# Patient Record
Sex: MALE | Race: OTHER | NOT HISPANIC OR LATINO
[De-identification: names, ages, dates, MRNs, and addresses within clinical notes are randomized per-mention and may not be internally consistent; named-entity substitution may affect disease eponyms.]

---

## 2019-11-06 ENCOUNTER — RESULT REVIEW (OUTPATIENT)
Age: 59
End: 2019-11-06

## 2020-07-31 PROBLEM — Z00.00 ENCOUNTER FOR PREVENTIVE HEALTH EXAMINATION: Status: ACTIVE | Noted: 2020-07-31

## 2020-08-06 ENCOUNTER — APPOINTMENT (OUTPATIENT)
Dept: UROLOGY | Facility: CLINIC | Age: 60
End: 2020-08-06
Payer: COMMERCIAL

## 2020-08-06 ENCOUNTER — OUTPATIENT (OUTPATIENT)
Dept: OUTPATIENT SERVICES | Facility: HOSPITAL | Age: 60
LOS: 1 days | Discharge: HOME | End: 2020-08-06

## 2020-08-06 DIAGNOSIS — N40.1 BENIGN PROSTATIC HYPERPLASIA WITH LOWER URINARY TRACT SYMPTOMS: ICD-10-CM

## 2020-08-06 DIAGNOSIS — Z78.9 OTHER SPECIFIED HEALTH STATUS: ICD-10-CM

## 2020-08-06 DIAGNOSIS — R39.12 POOR URINARY STREAM: ICD-10-CM

## 2020-08-06 DIAGNOSIS — R39.89 OTHER SYMPTOMS AND SIGNS INVOLVING THE GENITOURINARY SYSTEM: ICD-10-CM

## 2020-08-06 DIAGNOSIS — R97.20 ELEVATED PROSTATE SPECIFIC ANTIGEN [PSA]: ICD-10-CM

## 2020-08-06 PROCEDURE — 99203 OFFICE O/P NEW LOW 30 MIN: CPT

## 2020-09-01 PROBLEM — Z78.9 CURRENT NON-SMOKER: Status: ACTIVE | Noted: 2020-09-01

## 2020-09-01 NOTE — HISTORY OF PRESENT ILLNESS
[FreeTextEntry1] : This is a 60 year male who presented with high PSA of 129 on July 2020 -- old medical records that he brought in were reviewed. \par Had high psa in 2007 but doesn’t remember the value.  had no insurance at that time \par \par no blood in the urine\par \par also nocturia x 5, weak urinary stream and sensation of incomplete bladder emptying

## 2020-09-01 NOTE — PHYSICAL EXAM
[General Appearance - Well Developed] : well developed [General Appearance - Well Nourished] : well nourished [Normal Appearance] : normal appearance [Well Groomed] : well groomed [General Appearance - In No Acute Distress] : no acute distress [Edema] : no peripheral edema [Respiration, Rhythm And Depth] : normal respiratory rhythm and effort [Exaggerated Use Of Accessory Muscles For Inspiration] : no accessory muscle use [Abdomen Soft] : soft [Abdomen Tenderness] : non-tender [Costovertebral Angle Tenderness] : no ~M costovertebral angle tenderness [Urethral Meatus] : meatus normal [Urinary Bladder Findings] : the bladder was normal on palpation [Scrotum] : the scrotum was normal [FreeTextEntry1] : firm prostate [] : no rash [No Focal Deficits] : no focal deficits [Affect] : the affect was normal [Mood] : the mood was normal [Not Anxious] : not anxious

## 2020-09-11 ENCOUNTER — APPOINTMENT (OUTPATIENT)
Dept: UROLOGY | Facility: CLINIC | Age: 60
End: 2020-09-11

## 2020-10-05 LAB
APPEARANCE: CLEAR
BACTERIA UR CULT: NORMAL
BILIRUBIN URINE: NEGATIVE
BLOOD URINE: NEGATIVE
COLOR: NORMAL
GLUCOSE QUALITATIVE U: NEGATIVE
KETONES URINE: NEGATIVE
LEUKOCYTE ESTERASE URINE: NEGATIVE
NITRITE URINE: NEGATIVE
PH URINE: 6
PROTEIN URINE: NORMAL
PSA FREE FLD-MCNC: 6 %
PSA FREE SERPL-MCNC: 5.5 NG/ML
PSA SERPL-MCNC: 87.2 NG/ML
SPECIFIC GRAVITY URINE: 1.02
UROBILINOGEN URINE: NORMAL

## 2020-10-20 ENCOUNTER — NON-APPOINTMENT (OUTPATIENT)
Age: 60
End: 2020-10-20

## 2021-08-18 ENCOUNTER — APPOINTMENT (OUTPATIENT)
Dept: UROLOGY | Facility: CLINIC | Age: 61
End: 2021-08-18
Payer: COMMERCIAL

## 2021-08-18 ENCOUNTER — TRANSCRIPTION ENCOUNTER (OUTPATIENT)
Age: 61
End: 2021-08-18

## 2021-08-18 VITALS — WEIGHT: 165 LBS | BODY MASS INDEX: 30.36 KG/M2 | HEIGHT: 62 IN

## 2021-08-18 PROCEDURE — 99214 OFFICE O/P EST MOD 30 MIN: CPT

## 2021-08-18 NOTE — PHYSICAL EXAM
[Prostate Tenderness] : the prostate was not tender [Prostate Size ___ gm] : prostate size [unfilled] gm [FreeTextEntry1] : uniform firmness prostate

## 2021-08-18 NOTE — ASSESSMENT
[FreeTextEntry1] : This is a 60 year male who presented with high PSA of 129 on July 2020 -- old medical records that he brought in were reviewed. \par Had high psa in 2007 but doesn’t remember the value. had no insurance at that time \par \par no blood in the urine\par \par also nocturia x 5, weak urinary stream and sensation of incomplete bladder emptying. \par \par prostate was firm on HERSON \par \par  august 2020\par Culture - Urine; no growth\par PSA Profile - Total = 87\par Urinalysis w/Reflex;  neg\par \par Started: Dutasteride-Tamsulosin but he only took for a month or two\par he no showed for his visit thereafter-  he said had many time contraints due to his job\par

## 2021-08-25 ENCOUNTER — APPOINTMENT (OUTPATIENT)
Dept: UROLOGY | Facility: CLINIC | Age: 61
End: 2021-08-25
Payer: COMMERCIAL

## 2021-08-25 ENCOUNTER — LABORATORY RESULT (OUTPATIENT)
Age: 61
End: 2021-08-25

## 2021-08-25 DIAGNOSIS — R39.89 OTHER SYMPTOMS AND SIGNS INVOLVING THE GENITOURINARY SYSTEM: ICD-10-CM

## 2021-08-25 LAB
APPEARANCE: CLEAR
BACTERIA UR CULT: NORMAL
BILIRUBIN URINE: NEGATIVE
BLOOD URINE: NEGATIVE
COLOR: NORMAL
GLUCOSE QUALITATIVE U: NEGATIVE
KETONES URINE: NEGATIVE
LEUKOCYTE ESTERASE URINE: NEGATIVE
NITRITE URINE: NEGATIVE
PH URINE: 7.5
PROTEIN URINE: NEGATIVE
PSA FREE FLD-MCNC: 5 %
PSA FREE SERPL-MCNC: 7.42 NG/ML
PSA SERPL-MCNC: 138 NG/ML
SPECIFIC GRAVITY URINE: 1.01
UROBILINOGEN URINE: NORMAL

## 2021-08-25 PROCEDURE — 55700: CPT

## 2021-08-25 PROCEDURE — 99213 OFFICE O/P EST LOW 20 MIN: CPT | Mod: 25

## 2021-08-25 NOTE — ASSESSMENT
[FreeTextEntry1] : This is a 61 year male who presented with high PSA of 129 on July 2020 -- old medical records that he brought in were reviewed. \par Had high psa in 2007 but doesn’t remember the value. had no insurance at that time \par \par no blood in the urine\par \par also nocturia x 5, weak urinary stream and sensation of incomplete bladder emptying. \par \par prostate was firm on HERSON \par \par  august 2020\par Culture - Urine; no growth\par PSA Profile - Total = 87\par Urinalysis w/Reflex; neg\par \par Started: Dutasteride-Tamsulosin but he only took for a month or two\par he no showed for his visit thereafter- he said had many time contraints due to his job\par . \par Aug 2021\par Culture - Urine; negative\par PSA Profile - Total = 138-- 5% free psa\par Urinalysis w/Reflex; neg;

## 2021-08-25 NOTE — HISTORY OF PRESENT ILLNESS
[FreeTextEntry1] : This is a 61 year male who presented with high PSA of 129 on July 2020 -- old medical records that he brought in were reviewed. \par Had high psa in 2007 but doesn’t remember the value. had no insurance at that time \par \par no blood in the urine\par \par also nocturia x 5, weak urinary stream and sensation of incomplete bladder emptying. \par \par prostate was firm on HERSON \par \par  august 2020\par Culture - Urine; no growth\par PSA Profile - Total = 87\par Urinalysis w/Reflex; neg\par \par Started: Dutasteride-Tamsulosin but he only took for a month or two\par he no showed for his visit thereafter- he said had many time contraints due to his job\par . \par Aug 2021\par Culture - Urine; negative\par PSA Profile - Total = 138-- 5% free psa\par Urinalysis w/Reflex; neg; \par

## 2021-09-08 ENCOUNTER — APPOINTMENT (OUTPATIENT)
Dept: UROLOGY | Facility: CLINIC | Age: 61
End: 2021-09-08
Payer: COMMERCIAL

## 2021-09-08 VITALS — WEIGHT: 165 LBS | BODY MASS INDEX: 30.36 KG/M2 | HEIGHT: 62 IN

## 2021-09-08 PROCEDURE — 99214 OFFICE O/P EST MOD 30 MIN: CPT

## 2021-09-08 NOTE — HISTORY OF PRESENT ILLNESS
[FreeTextEntry1] : This is a 61 year male who presented with high PSA of 129 on July 2020 -- old medical records that he brought in were reviewed. \par Had high psa in 2007 but doesn’t remember the value. had no insurance at that time \par \par no blood in the urine\par \par also nocturia x 5, weak urinary stream and sensation of incomplete bladder emptying. \par \par prostate was firm on HERSON \par \par  august 2020\par Culture - Urine; no growth\par PSA Profile - Total = 87\par Urinalysis w/Reflex; neg\par \par Started: Dutasteride-Tamsulosin but he only took for a month or two\par he no showed for his visit thereafter- he said had many time contraints due to his job\par . \par Aug 2021\par Culture - Urine; negative\par PSA Profile - Total = 138-- 5% free psa\par Urinalysis w/Reflex; neg;. \par \par states that flomax and finasteride have helped urination substantially -- very happy with these results\par follow up in two weeks for prostate biopsy results \par \par prostate biopsy results from August 2021\par duglas 3+4 = 7  in 6 of 12 cores\par

## 2021-09-08 NOTE — ASSESSMENT
[FreeTextEntry1] : This is a 61 year male who presented with high PSA of 129 on July 2020 -- old medical records that he brought in were reviewed. \par Had high psa in 2007 but doesn’t remember the value. had no insurance at that time \par \par no blood in the urine\par \par also nocturia x 5, weak urinary stream and sensation of incomplete bladder emptying. \par \par prostate was firm on HERSON \par \par  august 2020\par Culture - Urine; no growth\par PSA Profile - Total = 87\par Urinalysis w/Reflex; neg\par \par Started: Dutasteride-Tamsulosin but he only took for a month or two\par he no showed for his visit thereafter- he said had many time contraints due to his job\par . \par Aug 2021\par Culture - Urine; negative\par PSA Profile - Total = 138-- 5% free psa\par Urinalysis w/Reflex; neg;. \par \par states that flomax and finasteride have helped urination substantially -- very happy with these results\par follow up in two weeks for prostate biopsy results \par \par prostate biopsy results from August 2021\par duglas 3+4 = 7  in 6 of 12 cores

## 2021-09-15 ENCOUNTER — RESULT REVIEW (OUTPATIENT)
Age: 61
End: 2021-09-15

## 2021-09-16 LAB — CREAT SERPL-MCNC: 0.9 MG/DL

## 2021-09-29 ENCOUNTER — OUTPATIENT (OUTPATIENT)
Dept: OUTPATIENT SERVICES | Facility: HOSPITAL | Age: 61
LOS: 1 days | Discharge: HOME | End: 2021-09-29
Payer: COMMERCIAL

## 2021-09-29 DIAGNOSIS — R10.2 PELVIC AND PERINEAL PAIN: ICD-10-CM

## 2021-09-29 DIAGNOSIS — C61 MALIGNANT NEOPLASM OF PROSTATE: ICD-10-CM

## 2021-09-29 PROCEDURE — 72193 CT PELVIS W/DYE: CPT | Mod: 26

## 2021-10-01 ENCOUNTER — OUTPATIENT (OUTPATIENT)
Dept: OUTPATIENT SERVICES | Facility: HOSPITAL | Age: 61
LOS: 1 days | Discharge: HOME | End: 2021-10-01
Payer: COMMERCIAL

## 2021-10-01 ENCOUNTER — RESULT REVIEW (OUTPATIENT)
Age: 61
End: 2021-10-01

## 2021-10-01 DIAGNOSIS — C61 MALIGNANT NEOPLASM OF PROSTATE: ICD-10-CM

## 2021-10-01 PROCEDURE — 78803 RP LOCLZJ TUM SPECT 1 AREA: CPT | Mod: 26

## 2021-10-01 PROCEDURE — 78306 BONE IMAGING WHOLE BODY: CPT | Mod: 26

## 2021-10-13 ENCOUNTER — APPOINTMENT (OUTPATIENT)
Dept: UROLOGY | Facility: CLINIC | Age: 61
End: 2021-10-13
Payer: COMMERCIAL

## 2021-10-13 VITALS — HEIGHT: 62 IN | WEIGHT: 165 LBS | BODY MASS INDEX: 30.36 KG/M2

## 2021-10-13 DIAGNOSIS — R97.20 ELEVATED PROSTATE, SPECIFIC ANTIGEN [PSA]: ICD-10-CM

## 2021-10-13 PROCEDURE — 99214 OFFICE O/P EST MOD 30 MIN: CPT

## 2021-10-13 RX ORDER — TAMSULOSIN HYDROCHLORIDE 0.4 MG/1
0.4 CAPSULE ORAL
Qty: 90 | Refills: 3 | Status: DISCONTINUED | COMMUNITY
Start: 2021-08-18 | End: 2021-10-13

## 2021-10-13 NOTE — HISTORY OF PRESENT ILLNESS
[FreeTextEntry1] : This is a 61 year male who presented with high PSA of 129 on July 2020 -- old medical records that he brought in were reviewed. \par Had high psa in 2007 but doesn’t remember the value. had no insurance at that time \par \par no blood in the urine\par \par also nocturia x 5, weak urinary stream and sensation of incomplete bladder emptying. \par \par prostate was firm on HERSON \par \par  august 2020\par Culture - Urine; no growth\par PSA Profile - Total = 87\par Urinalysis w/Reflex; neg\par \par Started: Dutasteride-Tamsulosin but he only took for a month or two\par he no showed for his visit thereafter- he said had many time contraints due to his job\par . \par Aug 2021\par Culture - Urine; negative\par PSA Profile - Total = 138-- 5% free psa\par Urinalysis w/Reflex; neg;. \par \par states that flomax and finasteride have helped urination substantially -- very happy with these results\par follow up in two weeks for prostate biopsy results \par \par prostate biopsy results from August 2021\par duglas 3+4 = 7 in 6 of 12 cores. \par \par  new results since last visit:\par Creatinine, Serum = 0.9\par CT Pelvis w/wo IV Cont; no lymphadenopathy\par NM 3-Phase Bone Scan; possible old trauma in left anterior third rib, otherwise no specific suggestion of metastasis\par

## 2021-10-20 ENCOUNTER — APPOINTMENT (OUTPATIENT)
Dept: RADIATION ONCOLOGY | Facility: HOSPITAL | Age: 61
End: 2021-10-20
Payer: COMMERCIAL

## 2021-10-20 VITALS
BODY MASS INDEX: 31.93 KG/M2 | OXYGEN SATURATION: 97 % | TEMPERATURE: 97.9 F | WEIGHT: 174.56 LBS | RESPIRATION RATE: 16 BRPM | SYSTOLIC BLOOD PRESSURE: 143 MMHG | DIASTOLIC BLOOD PRESSURE: 79 MMHG | HEART RATE: 102 BPM

## 2021-10-20 DIAGNOSIS — Z78.9 OTHER SPECIFIED HEALTH STATUS: ICD-10-CM

## 2021-10-20 PROCEDURE — 99204 OFFICE O/P NEW MOD 45 MIN: CPT

## 2021-10-20 RX ORDER — DUTASTERIDE AND TAMSULOSIN HYDROCHLORIDE .5; .4 MG/1; MG/1
0.5-0.4 CAPSULE ORAL
Qty: 90 | Refills: 3 | Status: DISCONTINUED | COMMUNITY
Start: 2020-08-06 | End: 2021-10-20

## 2021-10-20 RX ORDER — AMLODIPINE BESYLATE 10 MG/1
10 TABLET ORAL
Refills: 0 | Status: ACTIVE | COMMUNITY

## 2021-10-20 RX ORDER — CIPROFLOXACIN HYDROCHLORIDE 500 MG/1
500 TABLET, FILM COATED ORAL
Qty: 2 | Refills: 0 | Status: DISCONTINUED | COMMUNITY
Start: 2021-08-18 | End: 2021-10-20

## 2021-10-29 ENCOUNTER — NON-APPOINTMENT (OUTPATIENT)
Age: 61
End: 2021-10-29

## 2021-10-29 PROCEDURE — 76872 US TRANSRECTAL: CPT | Mod: 26

## 2021-10-29 PROCEDURE — 55874 TPRNL PLMT BIODEGRDABL MATRL: CPT

## 2021-10-29 PROCEDURE — 55876 PLACE RT DEVICE/MARKER PROS: CPT

## 2021-11-17 PROCEDURE — 77338 DESIGN MLC DEVICE FOR IMRT: CPT | Mod: 26

## 2021-11-17 PROCEDURE — 77301 RADIOTHERAPY DOSE PLAN IMRT: CPT | Mod: 26

## 2021-11-17 PROCEDURE — 77300 RADIATION THERAPY DOSE PLAN: CPT | Mod: 26

## 2021-12-06 PROCEDURE — G6002: CPT | Mod: 26

## 2021-12-07 ENCOUNTER — NON-APPOINTMENT (OUTPATIENT)
Age: 61
End: 2021-12-07

## 2021-12-07 VITALS
SYSTOLIC BLOOD PRESSURE: 153 MMHG | TEMPERATURE: 97 F | DIASTOLIC BLOOD PRESSURE: 88 MMHG | WEIGHT: 172 LBS | HEART RATE: 110 BPM | RESPIRATION RATE: 16 BRPM | OXYGEN SATURATION: 99 % | BODY MASS INDEX: 31.46 KG/M2

## 2021-12-07 PROCEDURE — G6002: CPT | Mod: 26

## 2021-12-07 RX ORDER — AMOXICILLIN AND CLAVULANATE POTASSIUM 875; 125 MG/1; MG/1
875-125 TABLET, COATED ORAL
Qty: 6 | Refills: 1 | Status: DISCONTINUED | COMMUNITY
Start: 2021-10-20 | End: 2021-12-07

## 2021-12-07 RX ORDER — DIAZEPAM 5 MG/1
5 TABLET ORAL
Qty: 2 | Refills: 0 | Status: COMPLETED | COMMUNITY
Start: 2021-10-20 | End: 2021-12-07

## 2021-12-07 NOTE — HISTORY OF PRESENT ILLNESS
[FreeTextEntry1] : 12/7/2021 OTV 540cGy/7020cGy to the prostate/pelvis: Pt received 2nd dose of RT. Denies any pain. Denies any burning, N/V/D. States he stopped taking Flomax due to side effects. Nocturia 1-2x/night. Skin care discussed.

## 2021-12-07 NOTE — PHYSICAL EXAM
[Normal] : oriented to person, place and time, the affect was normal, the mood was normal and not anxious [de-identified] : BMI 32

## 2021-12-07 NOTE — DISEASE MANAGEMENT
[Clinical] : TNM Stage: c [IIB] : IIB [TTNM] : 1c [NTNM] : o [MTNM] : o [de-identified] : 540cGy [de-identified] : 7017cGy [de-identified] : Prostate/pelvis

## 2021-12-08 PROCEDURE — G6002: CPT | Mod: 26

## 2021-12-09 PROCEDURE — G6002: CPT | Mod: 26

## 2021-12-10 PROCEDURE — 77427 RADIATION TX MANAGEMENT X5: CPT

## 2021-12-13 PROCEDURE — G6002: CPT | Mod: 26

## 2021-12-14 ENCOUNTER — NON-APPOINTMENT (OUTPATIENT)
Age: 61
End: 2021-12-14

## 2021-12-14 VITALS
BODY MASS INDEX: 31.46 KG/M2 | SYSTOLIC BLOOD PRESSURE: 125 MMHG | OXYGEN SATURATION: 98 % | HEART RATE: 95 BPM | WEIGHT: 172 LBS | DIASTOLIC BLOOD PRESSURE: 62 MMHG | TEMPERATURE: 97.2 F | RESPIRATION RATE: 16 BRPM

## 2021-12-14 PROCEDURE — G6002: CPT | Mod: 26

## 2021-12-14 NOTE — HISTORY OF PRESENT ILLNESS
[FreeTextEntry1] : 12/14/2021 OTV 1890cGy/7020cGy to the prostate/pelvis: pt denies any N/V/D, pain. Nocturia 1-2x/night. Skin care discussed.  denies any bowel issues.  \par \par 12/7/2021 OTV 540cGy/7020cGy to the prostate/pelvis: Pt received 2nd dose of RT. Denies any pain. Denies any burning, N/V/D. States he stopped taking Flomax due to side effects. Nocturia 1-2x/night. Skin care discussed.

## 2021-12-14 NOTE — DISEASE MANAGEMENT
[Clinical] : TNM Stage: c [IIB] : IIB [TTNM] : 1c [NTNM] : o [MTNM] : o [de-identified] : 1890cGy [de-identified] : 7007cGy [de-identified] : Prostate/pelvis

## 2021-12-14 NOTE — PHYSICAL EXAM
[Normal] : oriented to person, place and time, the affect was normal, the mood was normal and not anxious [de-identified] : BMI 32

## 2021-12-15 PROCEDURE — G6002: CPT | Mod: 26

## 2021-12-16 PROCEDURE — G6002: CPT | Mod: 26

## 2021-12-20 PROCEDURE — 77427 RADIATION TX MANAGEMENT X5: CPT

## 2021-12-20 PROCEDURE — G6002: CPT | Mod: 26

## 2021-12-21 ENCOUNTER — NON-APPOINTMENT (OUTPATIENT)
Age: 61
End: 2021-12-21

## 2021-12-21 VITALS
DIASTOLIC BLOOD PRESSURE: 63 MMHG | OXYGEN SATURATION: 96 % | WEIGHT: 172.38 LBS | RESPIRATION RATE: 16 BRPM | SYSTOLIC BLOOD PRESSURE: 122 MMHG | BODY MASS INDEX: 31.53 KG/M2 | TEMPERATURE: 97.3 F | HEART RATE: 83 BPM

## 2021-12-21 PROCEDURE — G6002: CPT | Mod: 26

## 2021-12-21 RX ORDER — FINASTERIDE 5 MG/1
5 TABLET, FILM COATED ORAL DAILY
Qty: 90 | Refills: 3 | Status: DISCONTINUED | COMMUNITY
Start: 2021-08-18 | End: 2021-12-21

## 2021-12-21 RX ORDER — SILODOSIN 4 MG/1
4 CAPSULE ORAL
Qty: 90 | Refills: 3 | Status: DISCONTINUED | COMMUNITY
Start: 2021-10-13 | End: 2021-12-21

## 2021-12-21 NOTE — REVIEW OF SYSTEMS
[Diarrhea: Grade 0] : Diarrhea: Grade 0 [Nausea: Grade 0] : Nausea: Grade 0 [Vomiting: Grade 0] : Vomiting: Grade 0 [Fatigue: Grade 0] : Fatigue: Grade 0 [Urinary Tract Pain: Grade 0] : Urinary Tract Pain: Grade 0 [Urinary Urgency: Grade 1 - Present] : Urinary Urgency: Grade 1 - Present [Urinary Frequency: Grade 1 - Present] : Urinary Frequency: Grade 1 - Present [Dermatitis Radiation: Grade 0] : Dermatitis Radiation: Grade 0

## 2021-12-22 PROCEDURE — G6002: CPT | Mod: 26

## 2021-12-22 NOTE — HISTORY OF PRESENT ILLNESS
[FreeTextEntry1] : 12/21/2021 OTV 3240cGy/7020cGy to the prostate/pelvis: Pt denies N/V/D. Notes slight increase in frequency. Nocturia 2-3x/night. Was prescribed Flomax but stopped due to congestion, which he feels was related to medication.\par \par 12/14/2021 OTV 1890cGy/7020cGy to the prostate/pelvis: pt denies any N/V/D, pain. Nocturia 1-2x/night. Skin care discussed.  denies any bowel issues.  \par \par 12/7/2021 OTV 540cGy/7020cGy to the prostate/pelvis: Pt received 2nd dose of RT. Denies any pain. Denies any burning, N/V/D. States he stopped taking Flomax due to side effects. Nocturia 1-2x/night. Skin care discussed.

## 2021-12-22 NOTE — PHYSICAL EXAM
[Sclera] : the sclera and conjunctiva were normal [Hearing Threshold Finger Rub Not [place]] : hearing was normal [] : no respiratory distress [Exaggerated Use Of Accessory Muscles For Inspiration] : no accessory muscle use [Normal] : oriented to person, place and time, the affect was normal, the mood was normal and not anxious

## 2021-12-27 PROCEDURE — G6002: CPT | Mod: 26

## 2021-12-28 ENCOUNTER — NON-APPOINTMENT (OUTPATIENT)
Age: 61
End: 2021-12-28

## 2021-12-28 VITALS
SYSTOLIC BLOOD PRESSURE: 123 MMHG | TEMPERATURE: 96.6 F | HEART RATE: 94 BPM | WEIGHT: 170 LBS | DIASTOLIC BLOOD PRESSURE: 80 MMHG | BODY MASS INDEX: 31.09 KG/M2 | OXYGEN SATURATION: 98 % | RESPIRATION RATE: 16 BRPM

## 2021-12-28 PROCEDURE — G6002: CPT | Mod: 26

## 2021-12-28 PROCEDURE — 77427 RADIATION TX MANAGEMENT X5: CPT

## 2021-12-28 NOTE — HISTORY OF PRESENT ILLNESS
[FreeTextEntry1] : 12/28/2021 OTV 4320cGy/7020cGy to the prostate/pelvis: Pt denies N/V/D. No complaints of burning or pain with urination. Nocturia 2-3x/night. No complaints at this time. \par \par 12/21/2021 OTV 3240cGy/7020cGy to the prostate/pelvis: Pt denies N/V/D. Notes slight increase in frequency. Nocturia 2-3x/night. Was prescribed Flomax but stopped due to congestion, which he feels was related to medication.\par \par 12/14/2021 OTV 1890cGy/7020cGy to the prostate/pelvis: pt denies any N/V/D, pain. Nocturia 1-2x/night. Skin care discussed.  denies any bowel issues.  \par \par 12/7/2021 OTV 540cGy/7020cGy to the prostate/pelvis: Pt received 2nd dose of RT. Denies any pain. Denies any burning, N/V/D. States he stopped taking Flomax due to side effects. Nocturia 1-2x/night. Skin care discussed.

## 2021-12-28 NOTE — DISEASE MANAGEMENT
[Clinical] : TNM Stage: c [IIB] : IIB [TTNM] : 1c [NTNM] : o [MTNM] : o [de-identified] : 5814cHr [de-identified] : 7010cGy [de-identified] : Prostate/pelvis

## 2021-12-28 NOTE — PHYSICAL EXAM
[Normal] : normoactive bowel sounds, soft and nontender, no hepatosplenomegaly or masses appreciated [de-identified] : BMI 32

## 2021-12-28 NOTE — REVIEW OF SYSTEMS
[Diarrhea: Grade 0] : Diarrhea: Grade 0 [Nausea: Grade 0] : Nausea: Grade 0 [Vomiting: Grade 0] : Vomiting: Grade 0 [Fatigue: Grade 0] : Fatigue: Grade 0 [Urinary Incontinence: Grade 0] : Urinary Incontinence: Grade 0  [Urinary Tract Pain: Grade 0] : Urinary Tract Pain: Grade 0 [Urinary Urgency: Grade 0] : Urinary Urgency: Grade 0 [Urinary Frequency: Grade 1 - Present] : Urinary Frequency: Grade 1 - Present [Dermatitis Radiation: Grade 0] : Dermatitis Radiation: Grade 0

## 2021-12-29 PROCEDURE — G6002: CPT | Mod: 26

## 2022-01-03 PROCEDURE — G6002: CPT | Mod: 26

## 2022-01-04 PROCEDURE — G6002: CPT | Mod: 26

## 2022-01-05 PROCEDURE — G6002: CPT | Mod: 26

## 2022-01-05 PROCEDURE — 77427 RADIATION TX MANAGEMENT X5: CPT

## 2022-01-06 PROCEDURE — G6002: CPT | Mod: 26

## 2022-01-07 ENCOUNTER — NON-APPOINTMENT (OUTPATIENT)
Age: 62
End: 2022-01-07

## 2022-01-07 VITALS
RESPIRATION RATE: 14 BRPM | DIASTOLIC BLOOD PRESSURE: 67 MMHG | WEIGHT: 174.8 LBS | HEART RATE: 95 BPM | BODY MASS INDEX: 31.97 KG/M2 | OXYGEN SATURATION: 99 % | SYSTOLIC BLOOD PRESSURE: 124 MMHG | TEMPERATURE: 96.1 F

## 2022-01-07 PROCEDURE — G6002: CPT | Mod: 26

## 2022-01-07 NOTE — HISTORY OF PRESENT ILLNESS
[FreeTextEntry1] : 1/7/2022 OTV 6210cGy/7020cGy to the prostate/pelvis: Patient states he feels well, denies pain or discomfort only complaint is frequency along with a weak stream. \par \par 12/28/2021 OTV 4320cGy/7020cGy to the prostate/pelvis: Pt denies N/V/D. No complaints of burning or pain with urination. Nocturia 2-3x/night. No complaints at this time. \par \par 12/21/2021 OTV 3240cGy/7020cGy to the prostate/pelvis: Pt denies N/V/D. Notes slight increase in frequency. Nocturia 2-3x/night. Was prescribed Flomax but stopped due to congestion, which he feels was related to medication.\par \par 12/14/2021 OTV 1890cGy/7020cGy to the prostate/pelvis: pt denies any N/V/D, pain. Nocturia 1-2x/night. Skin care discussed.  denies any bowel issues.  \par \par 12/7/2021 OTV 540cGy/7020cGy to the prostate/pelvis: Pt received 2nd dose of RT. Denies any pain. Denies any burning, N/V/D. States he stopped taking Flomax due to side effects. Nocturia 1-2x/night. Skin care discussed.

## 2022-01-07 NOTE — PHYSICAL EXAM
[Normal] : normoactive bowel sounds, soft and nontender, no hepatosplenomegaly or masses appreciated [de-identified] : BMI 32

## 2022-01-07 NOTE — DISEASE MANAGEMENT
[Clinical] : TNM Stage: c [IIB] : IIB [TTNM] : 1c [NTNM] : o [MTNM] : o [de-identified] : 6210cGy [de-identified] : 7040cGy [de-identified] : Prostate/pelvis

## 2022-01-10 PROCEDURE — G6002: CPT | Mod: 26

## 2022-01-11 ENCOUNTER — NON-APPOINTMENT (OUTPATIENT)
Age: 62
End: 2022-01-11

## 2022-01-11 VITALS
DIASTOLIC BLOOD PRESSURE: 57 MMHG | OXYGEN SATURATION: 99 % | WEIGHT: 173 LBS | BODY MASS INDEX: 31.64 KG/M2 | RESPIRATION RATE: 16 BRPM | TEMPERATURE: 96.6 F | SYSTOLIC BLOOD PRESSURE: 120 MMHG | HEART RATE: 102 BPM

## 2022-01-11 PROCEDURE — G6002: CPT | Mod: 26

## 2022-01-11 NOTE — PHYSICAL EXAM
[Normal] : normoactive bowel sounds, soft and nontender, no hepatosplenomegaly or masses appreciated [de-identified] : BMI 32

## 2022-01-11 NOTE — DISEASE MANAGEMENT
[Clinical] : TNM Stage: c [IIB] : IIB [TTNM] : 1c [NTNM] : o [MTNM] : o [de-identified] : 6750cGy [de-identified] : 7022cGy [de-identified] : Prostate/pelvis

## 2022-01-11 NOTE — HISTORY OF PRESENT ILLNESS
[FreeTextEntry1] : 1/11/2022 OTV 6750cGy/7020cGy to the prostate/pelvis: Pt states he is doing well. Denies any pain or burning. States his frequency decreased from last week. Nocturia 2-3x/night. Denies any bowel issues. \par \par 1/7/2022 OTV 6210cGy/7020cGy to the prostate/pelvis: Patient states he feels well, denies pain or discomfort only complaint is frequency along with a weak stream. \par \par 12/28/2021 OTV 4320cGy/7020cGy to the prostate/pelvis: Pt denies N/V/D. No complaints of burning or pain with urination. Nocturia 2-3x/night. No complaints at this time. \par \par 12/21/2021 OTV 3240cGy/7020cGy to the prostate/pelvis: Pt denies N/V/D. Notes slight increase in frequency. Nocturia 2-3x/night. Was prescribed Flomax but stopped due to congestion, which he feels was related to medication.\par \par 12/14/2021 OTV 1890cGy/7020cGy to the prostate/pelvis: pt denies any N/V/D, pain. Nocturia 1-2x/night. Skin care discussed.  denies any bowel issues.  \par \par 12/7/2021 OTV 540cGy/7020cGy to the prostate/pelvis: Pt received 2nd dose of RT. Denies any pain. Denies any burning, N/V/D. States he stopped taking Flomax due to side effects. Nocturia 1-2x/night. Skin care discussed.

## 2022-01-12 ENCOUNTER — OUTPATIENT (OUTPATIENT)
Dept: OUTPATIENT SERVICES | Facility: HOSPITAL | Age: 62
LOS: 1 days | Discharge: HOME | End: 2022-01-12
Payer: COMMERCIAL

## 2022-01-12 DIAGNOSIS — C61 MALIGNANT NEOPLASM OF PROSTATE: ICD-10-CM

## 2022-01-12 PROCEDURE — 77427 RADIATION TX MANAGEMENT X5: CPT

## 2022-01-13 NOTE — DISEASE MANAGEMENT
[Clinical] : TNM Stage: c [IIB] : IIB [TTNM] : 1c [NTNM] : o [MTNM] : o [de-identified] : 540cGy [de-identified] : 7071cGy [de-identified] : Prostate/pelvis

## 2022-01-13 NOTE — REVIEW OF SYSTEMS
[Patient Intake Form Reviewed] : Patient intake form was reviewed [Urinary Frequency] : urinary frequency [EPIC-CP Score (0-60): ___] : EPIC-CP score: [unfilled] [Negative] : Allergic/Immunologic [FreeTextEntry8] : urinary urgency

## 2022-01-13 NOTE — PHYSICAL EXAM
[Normal] : oriented to person, place and time, the affect was normal, the mood was normal and not anxious [de-identified] : BMI 32 [FreeTextEntry1] : Modest enlargement of the prostate, no hardness (induration) or nodularity,

## 2022-02-18 ENCOUNTER — APPOINTMENT (OUTPATIENT)
Dept: RADIATION ONCOLOGY | Facility: HOSPITAL | Age: 62
End: 2022-02-18
Payer: SELF-PAY

## 2022-02-18 VITALS
TEMPERATURE: 97.2 F | DIASTOLIC BLOOD PRESSURE: 69 MMHG | BODY MASS INDEX: 30.91 KG/M2 | HEART RATE: 97 BPM | RESPIRATION RATE: 12 BRPM | OXYGEN SATURATION: 97 % | SYSTOLIC BLOOD PRESSURE: 111 MMHG | WEIGHT: 169 LBS

## 2022-02-18 PROCEDURE — 99024 POSTOP FOLLOW-UP VISIT: CPT

## 2022-02-18 NOTE — PHYSICAL EXAM
[Normal] : normoactive bowel sounds, soft and nontender, no hepatosplenomegaly or masses appreciated [de-identified] : BMI 32

## 2022-02-18 NOTE — DISEASE MANAGEMENT
[Clinical] : TNM Stage: c [IIB] : IIB [TTNM] : 1c [NTNM] : o [MTNM] : o [de-identified] : 6750cGy [de-identified] : 7043cGy [de-identified] : Prostate/pelvis

## 2022-03-21 LAB — PSA SERPL-MCNC: 23.9 NG/ML

## 2022-04-13 ENCOUNTER — APPOINTMENT (OUTPATIENT)
Dept: UROLOGY | Facility: CLINIC | Age: 62
End: 2022-04-13
Payer: SELF-PAY

## 2022-04-13 DIAGNOSIS — C61 MALIGNANT NEOPLASM OF PROSTATE: ICD-10-CM

## 2022-04-13 DIAGNOSIS — R39.12 POOR URINARY STREAM: ICD-10-CM

## 2022-04-13 DIAGNOSIS — N40.1 BENIGN PROSTATIC HYPERPLASIA WITH LOWER URINARY TRACT SYMPMS: ICD-10-CM

## 2022-04-13 DIAGNOSIS — R35.1 BENIGN PROSTATIC HYPERPLASIA WITH LOWER URINARY TRACT SYMPMS: ICD-10-CM

## 2022-04-13 PROCEDURE — 99213 OFFICE O/P EST LOW 20 MIN: CPT

## 2022-04-29 PROBLEM — C61 PROSTATE CANCER: Status: ACTIVE | Noted: 2021-09-08

## 2022-04-29 PROBLEM — N40.1 BENIGN PROSTATIC HYPERPLASIA WITH NOCTURIA: Status: ACTIVE | Noted: 2020-08-06

## 2022-04-29 PROBLEM — R39.12 WEAK URINARY STREAM: Status: ACTIVE | Noted: 2020-08-06

## 2022-04-29 NOTE — ASSESSMENT
[FreeTextEntry1] : This is a 62 year old male who presented with a high PSA of 129 in 2020. In August of 2021 patient underwent prostate biopsy which revealed [place] 3+4=7 in 6/12 cores. Follow up CT scan revealed no lymphadenopathy and Bone scan showed a possible old trauma in left anterior third rib, otherwise no specific suggestion of metastasis. \par \par Patient is s/p radiation therapy 7020cGy to the prostate/pelvis from 12/06/2021-01/12/2021 with Dr. Trammell. PSA 02/18/2022 revealed PSA of 23.9 ng/mL which is improved from his PSA at time of biopsy 08/2021 which was 138.00 ng/mL \par \par Patient states that he feels well. He denies dysuria, gross hematuria, and bowel issues. He states that his urinary symptoms are stable.

## 2022-04-29 NOTE — REVIEW OF SYSTEMS
[see HPI] : see HPI [Fever] : no fever [Chills] : no chills [Chest Pain] : no chest pain [Shortness Of Breath] : no shortness of breath [Vomiting] : no vomiting [Abdominal Pain] : no abdominal pain

## 2022-07-13 ENCOUNTER — APPOINTMENT (OUTPATIENT)
Dept: UROLOGY | Facility: CLINIC | Age: 62
End: 2022-07-13

## 2024-10-18 NOTE — DISEASE MANAGEMENT
[Clinical] : TNM Stage: c [IIB] : IIB [TTNM] : 1c [NTNM] : o [MTNM] : o [de-identified] : 8944cZl [de-identified] : 7052cGy [de-identified] : Prostate/pelvis Normal for race